# Patient Record
Sex: MALE | Employment: FULL TIME | URBAN - METROPOLITAN AREA
[De-identification: names, ages, dates, MRNs, and addresses within clinical notes are randomized per-mention and may not be internally consistent; named-entity substitution may affect disease eponyms.]

---

## 2017-02-06 ENCOUNTER — INITIAL CONSULT (OUTPATIENT)
Dept: OPTOMETRY | Facility: CLINIC | Age: 22
End: 2017-02-06
Payer: COMMERCIAL

## 2017-02-06 ENCOUNTER — OFFICE VISIT (OUTPATIENT)
Dept: OPTOMETRY | Facility: CLINIC | Age: 22
End: 2017-02-06

## 2017-02-06 DIAGNOSIS — Z01.00 ROUTINE EYE EXAM: Primary | ICD-10-CM

## 2017-02-06 DIAGNOSIS — H52.13 MYOPIA WITH ASTIGMATISM, BILATERAL: Primary | ICD-10-CM

## 2017-02-06 DIAGNOSIS — H52.13 MYOPIA WITH ASTIGMATISM, BILATERAL: ICD-10-CM

## 2017-02-06 DIAGNOSIS — H52.203 MYOPIA WITH ASTIGMATISM, BILATERAL: Primary | ICD-10-CM

## 2017-02-06 DIAGNOSIS — H52.203 MYOPIA WITH ASTIGMATISM, BILATERAL: ICD-10-CM

## 2017-02-06 PROCEDURE — 92310 CONTACT LENS FITTING OU: CPT | Mod: ,,, | Performed by: OPTOMETRIST

## 2017-02-06 PROCEDURE — 99999 PR PBB SHADOW E&M-EST. PATIENT-LVL II: CPT | Mod: PBBFAC,,, | Performed by: OPTOMETRIST

## 2017-02-06 PROCEDURE — 99499 UNLISTED E&M SERVICE: CPT | Mod: S$PBB,,, | Performed by: OPTOMETRIST

## 2017-02-06 PROCEDURE — 92004 COMPRE OPH EXAM NEW PT 1/>: CPT | Mod: S$GLB,,, | Performed by: OPTOMETRIST

## 2017-02-06 PROCEDURE — 92015 DETERMINE REFRACTIVE STATE: CPT | Mod: S$GLB,,, | Performed by: OPTOMETRIST

## 2017-02-06 NOTE — PROGRESS NOTES
HPI     Blurred Vision    Additional comments: distance vision for 3-4 months           Comments   21 yr old here for a full eye exam and contact lens follow up.  Pt states   that he noticed his distance vision is more blurry for the past 3-4 months   and has been stable not getting worse.  Wearing [employer] contact lens   -3.75 and -3.50.  Daily wear only.  Happy with contact lens fit.   No headache  No diplopia  No flashes or floaters   No itch tear or burn  No pain  No injury or surgery       Last edited by Dena Morales on 2/6/2017 10:37 AM. (History)        ROS     Negative for: Constitutional, Gastrointestinal, Neurological, Skin,   Genitourinary, Musculoskeletal, HENT, Endocrine, Cardiovascular, Eyes,   Respiratory, Psychiatric, Allergic/Imm, Heme/Lymph    Last edited by Irais Dykes, OD on 2/6/2017 12:19 PM. (History)        Assessment /Plan     For exam results, see Encounter Report.    Routine eye exam    Myopia with astigmatism, bilateral            1-2.  Glasses and contact lens rx given.  Eye health normal.   RTC 1 year for routine exam.

## 2017-02-06 NOTE — PROGRESS NOTES
Assessment /Plan     For exam results, see Encounter Report.    Myopia with astigmatism, bilateral          1.  See note with same date.